# Patient Record
Sex: FEMALE | Race: WHITE | Employment: OTHER | URBAN - METROPOLITAN AREA
[De-identification: names, ages, dates, MRNs, and addresses within clinical notes are randomized per-mention and may not be internally consistent; named-entity substitution may affect disease eponyms.]

---

## 2017-06-08 ENCOUNTER — TRANSFERRED RECORDS (OUTPATIENT)
Dept: HEALTH INFORMATION MANAGEMENT | Facility: CLINIC | Age: 82
End: 2017-06-08

## 2017-11-30 ENCOUNTER — TRANSFERRED RECORDS (OUTPATIENT)
Dept: HEALTH INFORMATION MANAGEMENT | Facility: CLINIC | Age: 82
End: 2017-11-30

## 2019-10-07 ENCOUNTER — OFFICE VISIT (OUTPATIENT)
Dept: FAMILY MEDICINE | Facility: CLINIC | Age: 84
End: 2019-10-07
Payer: MEDICARE

## 2019-10-07 VITALS
BODY MASS INDEX: 27.47 KG/M2 | DIASTOLIC BLOOD PRESSURE: 76 MMHG | HEIGHT: 62 IN | SYSTOLIC BLOOD PRESSURE: 127 MMHG | WEIGHT: 149.3 LBS | TEMPERATURE: 98.6 F | RESPIRATION RATE: 16 BRPM | OXYGEN SATURATION: 97 % | HEART RATE: 76 BPM

## 2019-10-07 DIAGNOSIS — Z85.038 HISTORY OF COLON CANCER: ICD-10-CM

## 2019-10-07 DIAGNOSIS — E03.9 HYPOTHYROIDISM, UNSPECIFIED TYPE: Primary | ICD-10-CM

## 2019-10-07 DIAGNOSIS — Z85.3 HX: BREAST CANCER: ICD-10-CM

## 2019-10-07 PROCEDURE — 99203 OFFICE O/P NEW LOW 30 MIN: CPT | Performed by: PHYSICIAN ASSISTANT

## 2019-10-07 PROCEDURE — 84439 ASSAY OF FREE THYROXINE: CPT | Performed by: PHYSICIAN ASSISTANT

## 2019-10-07 PROCEDURE — 84443 ASSAY THYROID STIM HORMONE: CPT | Performed by: PHYSICIAN ASSISTANT

## 2019-10-07 PROCEDURE — 36415 COLL VENOUS BLD VENIPUNCTURE: CPT | Performed by: PHYSICIAN ASSISTANT

## 2019-10-07 ASSESSMENT — MIFFLIN-ST. JEOR: SCORE: 1090.47

## 2019-10-07 NOTE — PROGRESS NOTES
"Subjective     Rosalba Logan is a 84 year old female who presents to clinic today for the following health issues:    HPI   Chief Complaint   Patient presents with     Establish Care     Establish Care; would also like to get Thyroid checked     Patient here today to Establish Care and to have her Thyroid checked-    She did live here 15+ years ago, but did move to [place] to help with daughter.  They have moved back recently.    Since we have seen her last, she did have breast cancer and did have b/l mastectomy.  She also had colon cancer over 20 years ago.  No issues since.  She was due to have recheck colonoscopy last year, is unsure if she wants to continue screening at this time.    She has been on synthroid for the last 3 months, and was supposed to recheck soon, but due to moving, has not.  Does feel that her dose may be a bit off.  Last TSH in June was 0.07 and free T4 was 1.13.  She is currently on 50 mcg synthroid.        BP Readings from Last 3 Encounters:   10/07/19 127/76    Wt Readings from Last 3 Encounters:   10/07/19 67.7 kg (149 lb 4.8 oz)                      Reviewed and updated as needed this visit by Provider         Review of Systems   ROS COMP: Constitutional, HEENT, cardiovascular, pulmonary, gi and gu systems are negative, except as otherwise noted.      Objective    /76   Pulse 76   Temp 98.6  F (37  C) (Oral)   Resp 16   Ht 1.575 m (5' 2\")   Wt 67.7 kg (149 lb 4.8 oz)   SpO2 97%   BMI 27.31 kg/m    Body mass index is 27.31 kg/m .  Physical Exam   GENERAL: alert and no distress  EYES: Eyes grossly normal to inspection  RESP: lungs clear to auscultation - no rales, rhonchi or wheezes  CV: regular rate and rhythm, normal S1 S2, no S3 or S4, no murmur, click or rub, no peripheral edema and peripheral pulses strong  PSYCH: mentation appears normal, affect normal/bright    Diagnostic Test Results:  Labs reviewed in Epic        Assessment & Plan     1. Hypothyroidism, unspecified " "type  Due for recheck.  Will call in refill or adjust med based on results.  - TSH  - T4, free    2. History of colon cancer  Over 20 years ago, no issues since.  Unsure if she wishes to continue colon screening.  She is going to continue to think about    3. HX: breast cancer  S/p b/l mastectomy.    She is not interested in any vaccines at this point.    BMI:   Estimated body mass index is 27.31 kg/m  as calculated from the following:    Height as of this encounter: 1.575 m (5' 2\").    Weight as of this encounter: 67.7 kg (149 lb 4.8 oz).               Return in about 6 months (around 4/7/2020).    Antony Flowers PA-C  [facility]      "

## 2019-10-07 NOTE — NURSING NOTE
"Chief Complaint   Patient presents with     Establish Care     Establish Care; would also like to get Thyroid checked     /76   Pulse 76   Resp 16   Ht 1.575 m (5' 2\")   Wt 67.7 kg (149 lb 4.8 oz)   SpO2 97%   BMI 27.31 kg/m   Estimated body mass index is 27.31 kg/m  as calculated from the following:    Height as of this encounter: 1.575 m (5' 2\").    Weight as of this encounter: 67.7 kg (149 lb 4.8 oz).  Medication Reconciliation: complete        Health Maintenance Due Pending Provider Review:  NONE    n/a    Alba Austin MA  [facility]  [phone]  "

## 2019-10-08 ENCOUNTER — TELEPHONE (OUTPATIENT)
Dept: FAMILY MEDICINE | Facility: CLINIC | Age: 84
End: 2019-10-08

## 2019-10-08 PROBLEM — C50.919 BREAST CANCER (H): Status: ACTIVE | Noted: 2019-10-08

## 2019-10-08 LAB
T4 FREE SERPL-MCNC: 0.96 NG/DL (ref 0.76–1.46)
TSH SERPL DL<=0.005 MIU/L-ACNC: 10.85 MU/L (ref 0.4–4)

## 2019-10-08 RX ORDER — LEVOTHYROXINE SODIUM 75 UG/1
75 TABLET ORAL DAILY
Qty: 90 TABLET | Refills: 1 | Status: SHIPPED | OUTPATIENT
Start: 2019-10-08 | End: 2020-05-20

## 2019-10-08 RX ORDER — LEVOTHYROXINE SODIUM 50 UG/1
50 TABLET ORAL DAILY
COMMUNITY
Start: 2019-10-08 | End: 2019-10-08

## 2019-10-08 NOTE — TELEPHONE ENCOUNTER
Notes recorded by Antony Flowers PA-C on 10/8/2019 at 5:04 PM CDT    Please call with results.    Her tsh level is elevated, which means we need to increase her synthroid dose.  She was symptomatic, so this was expected.  I see she is on 50 mcg, so will call in 75 mcg.  Will recheck this in 3-6 months    Miguel Angel Flowers PA-C

## 2019-10-08 NOTE — RESULT ENCOUNTER NOTE
Please call with results.    Her tsh level is elevated, which means we need to increase her synthroid dose.  She was symptomatic, so this was expected.  I see she is on 50 mcg, so will call in 75 mcg.  Will recheck this in 3-6 months    Miguel Angel Flowers PA-C

## 2019-10-31 ENCOUNTER — HEALTH MAINTENANCE LETTER (OUTPATIENT)
Age: 84
End: 2019-10-31

## 2019-11-04 ENCOUNTER — ANCILLARY PROCEDURE (OUTPATIENT)
Dept: GENERAL RADIOLOGY | Facility: CLINIC | Age: 84
End: 2019-11-04
Attending: FAMILY MEDICINE
Payer: MEDICARE

## 2019-11-04 ENCOUNTER — OFFICE VISIT (OUTPATIENT)
Dept: FAMILY MEDICINE | Facility: CLINIC | Age: 84
End: 2019-11-04
Payer: MEDICARE

## 2019-11-04 VITALS
HEIGHT: 62 IN | HEART RATE: 74 BPM | TEMPERATURE: 97.4 F | RESPIRATION RATE: 14 BRPM | SYSTOLIC BLOOD PRESSURE: 121 MMHG | BODY MASS INDEX: 27.57 KG/M2 | OXYGEN SATURATION: 100 % | WEIGHT: 149.8 LBS | DIASTOLIC BLOOD PRESSURE: 73 MMHG

## 2019-11-04 DIAGNOSIS — L85.3 DRY SKIN: ICD-10-CM

## 2019-11-04 DIAGNOSIS — S62.316A CLOSED DISPLACED FRACTURE OF BASE OF FIFTH METACARPAL BONE OF RIGHT HAND, INITIAL ENCOUNTER: Primary | ICD-10-CM

## 2019-11-04 PROCEDURE — 99214 OFFICE O/P EST MOD 30 MIN: CPT | Performed by: FAMILY MEDICINE

## 2019-11-04 PROCEDURE — 73130 X-RAY EXAM OF HAND: CPT | Mod: RT

## 2019-11-04 ASSESSMENT — PAIN SCALES - GENERAL: PAINLEVEL: MODERATE PAIN (4)

## 2019-11-04 ASSESSMENT — MIFFLIN-ST. JEOR: SCORE: 1092.74

## 2019-11-04 NOTE — PROGRESS NOTES
Subjective     Rosalba Logan is a 84 year old female who presents to clinic today for the following health issues:    HPI     Patient in for right hand pain, Fell about 2 weeks ago and landed on her hand somehow, Also would like for Dr Mccarty to look at a rash on her back, states that its been there for a long time     PROBLEMS TO ADD ON...    BP Readings from Last 3 Encounters:   11/04/19 121/73   10/07/19 127/76    Wt Readings from Last 3 Encounters:   11/04/19 67.9 kg (149 lb 12.8 oz)   10/07/19 67.7 kg (149 lb 4.8 oz)                    PROBLEMS TO ADD ON...  Reviewed and updated as needed this visit by Provider         Review of Systems   ROS COMP: Constitutional, HEENT, cardiovascular, pulmonary, GI, , musculoskeletal, neuro, skin, endocrine and psych systems are negative, except as otherwise noted.      Objective    There were no vitals taken for this visit.  There is no height or weight on file to calculate BMI.  Physical Exam   GENERAL: healthy, alert and no distress  NECK: no adenopathy, no asymmetry, masses, or scars and thyroid normal to palpation  RESP: lungs clear to auscultation - no rales, rhonchi or wheezes  CV: regular rate and rhythm, normal S1 S2, no S3 or S4, no murmur, click or rub, no peripheral edema and peripheral pulses strong  ABDOMEN: soft, nontender, no hepatosplenomegaly, no masses and bowel sounds normal  MS: no gross musculoskeletal defects noted, no edema    Diagnostic Test Results:  Labs reviewed in Epic        Assessment & Plan     1. Closed displaced fracture of base of fifth metacarpal bone of right hand, initial encounter    - XR Hand Right G/E 3 Views, does show minimally displaced 5th MCP fracture.  Does need short cast- see orthopedic .  - ORTHOPEDICS ADULT REFERRAL    2. Dry skin  Avoid hot water showers  Use moisturizer  within  mins of shower  Follow up as needed         BMI:   Estimated body mass index is 27.4 kg/m  as calculated from the following:    Height as  "of this encounter: 1.575 m (5' 2\").    Weight as of this encounter: 67.9 kg (149 lb 12.8 oz).       }    Return in about 4 months (around 3/4/2020) for concerns,unresolved.    Geetha Mccarty MD  [facility]        "

## 2019-11-04 NOTE — RESULT ENCOUNTER NOTE
Dear Rosalba    The radiologist confirmed the fracture-that we discussed in the clinic today.  I do recommend to proceed with orthopedic consultation- & the conceirge service should be calling you back in 1-2 days.    Please keep us posted with questions or concerns .      Best Regards,    Geetha Mccarty MD  [facility]  [phone]

## 2019-11-04 NOTE — PATIENT INSTRUCTIONS
1. Hand injury, right, initial encounter  - XR Hand Right G/E 3 Views  - fracture- see orthopedic    2. Dry skin  Over the counter Eucerin  Avoid hot water shower

## 2019-11-07 ENCOUNTER — TELEPHONE (OUTPATIENT)
Dept: FAMILY MEDICINE | Facility: CLINIC | Age: 84
End: 2019-11-07

## 2019-11-07 NOTE — TELEPHONE ENCOUNTER
Reason for Call:  Other call back    Detailed comments: Patient is calling because she got referred to  with sport medicine and patient thought she was going to be referred to a Ortho surgeon. She is calling to fady who she sould need to see first.     Phone Number Patient can be reached at: Home number on file [phone] (home)    Best Time: anytime     Can we leave a detailed message on this number? YES    Call taken on 11/7/2019 at 10:20 AM by Ольга Young

## 2019-11-08 NOTE — TELEPHONE ENCOUNTER
Spoke with patient today   Patient was a little confused on who or where she needed to go for  Her fracture  Patient stats she has a up coming appointment with  for a consult  Advised patient to keep appointment and from there  will either refer her to having surgery or  His recommendation.  Is this what you wanted patient to do.

## 2019-11-11 ENCOUNTER — OFFICE VISIT (OUTPATIENT)
Dept: ORTHOPEDICS | Facility: CLINIC | Age: 84
End: 2019-11-11
Payer: MEDICARE

## 2019-11-11 VITALS
HEIGHT: 62 IN | SYSTOLIC BLOOD PRESSURE: 135 MMHG | BODY MASS INDEX: 27.42 KG/M2 | DIASTOLIC BLOOD PRESSURE: 72 MMHG | WEIGHT: 149 LBS

## 2019-11-11 DIAGNOSIS — S62.356A CLOSED NONDISPLACED FRACTURE OF SHAFT OF FIFTH METACARPAL BONE OF RIGHT HAND, INITIAL ENCOUNTER: Primary | ICD-10-CM

## 2019-11-11 PROCEDURE — 99203 OFFICE O/P NEW LOW 30 MIN: CPT | Performed by: FAMILY MEDICINE

## 2019-11-11 ASSESSMENT — MIFFLIN-ST. JEOR: SCORE: 1089.11

## 2019-11-11 NOTE — LETTER
11/11/2019         RE: Rosalba Logan  [address] [id number]        Dear Colleague,    Thank you for referring your patient, Rosalba Logan, to the  SPORTS MEDICINE. Please see a copy of my visit note below.    [facility]   Clinic Visit s Nov 11, 2019    PCP: No Ref-Primary, Physician      Rosalba is a 84 year old female who is seen in consultation at the request of Dr. Mccarty for   Chief Complaint   Patient presents with     Right Hand - Pain       Injury: Patient describes injury as FOOSH.      Right hand dominant    Location of Pain: right hand ulnar, nonradiating   Duration of Pain: acute, 1 month(s),   Rating of Pain at worst: 8/10  Rating of Pain Currently: 2/10  Pain is better with: activity avoidance   Pain is worse with: ADLs:  all activities, gripping and lifting  Treatment so far consists of: activity avoidance, rest and advil  Associated symptoms: swelling Mild  Recent imaging completed: X-rays completed 11/4/19.  Prior History of related problems: none    Social History: retired     Past Medical History:   Diagnosis Date     Breast cancer (H) 10/8/2019     Colon cancer (H) 10/8/2019       Patient Active Problem List    Diagnosis Date Noted     Closed displaced fracture of base of fifth metacarpal bone of right hand, initial encounter 11/04/2019     Priority: Medium     Dry skin 11/04/2019     Priority: Medium     Hypothyroidism, unspecified type 10/08/2019     Priority: Medium     Colon cancer (H) 10/08/2019     Priority: Medium     Breast cancer (H) 10/08/2019     Priority: Medium     Family medical history reviewed, noncontributory towards today's issue.    Social History     Socioeconomic History     Marital status: Single     Spouse name: Not on file     Number of children: Not on file     Years of education: Not on file     Highest education level: Not on file   Occupational History     Not on file   Social Needs     Financial  "resource strain: Not on file     Food insecurity:     Worry: Not on file     Inability: Not on file     Transportation needs:     Medical: Not on file     Non-medical: Not on file   Tobacco Use     Smoking status: Former Smoker     Packs/day: 0.00     Smokeless tobacco: Never Used       Past Surgical History:   Procedure Laterality Date     COLON SURGERY       MASTECTOMY, BILATERAL             Review of Systems   Musculoskeletal: Positive for joint pain.   All other systems reviewed and are negative.        Physical Exam  /72   Ht 1.575 m (5' 2\")   Wt 67.6 kg (149 lb)   BMI 27.25 kg/m     Constitutional:well-developed, well-nourished, and in no distress.   Cardiovascular: Intact distal pulses.    Neurological: alert. Gait Normal:   Gait, station, stance, and balance appear normal for age  Skin: Skin is warm and dry.   Psychiatric: Mood and affect normal.   Respiratory: unlabored, speaks in full sentences  Lymph: no LAD, no lymphangitis            Right Hand Exam     Tenderness   The patient is experiencing tenderness in the ulnar area.    Range of Motion   Wrist   Extension: 45   Flexion: normal   Pronation: normal   Supination: normal     Muscle Strength   Wrist extension: 5/5   Wrist flexion: 5/5   : 4/5     Other   Erythema: absent  Scars: absent  Sensation: normal  Pulse: present    Comments:  Mild tenderness over the midshaft of the fifth metacarpal region, which has a well-rounded bony prominence along the dorsal aspect of the fifth metacarpal          X-ray images previously ordered and independently reviewed by me in the office today with the patient. X-ray shows:     Recent Results (from the past 744 hour(s))   XR Hand Right G/E 3 Views    Narrative    XR RIGHT HAND THREE OR MORE VIEWS   11/4/2019 3:37 PM     HISTORY: Closed displaced fracture of base of fifth metacarpal bone of  right hand, initial encounter.    COMPARISON: None.    FINDINGS: There is a minimally displaced transverse fracture " in the  proximal metaphysis of the fifth metacarpal. No angulation.    Mild osteoarthrosis of the STT joint. Moderate osteoarthrosis of the  first CMC joint. Mild osteoarthrosis in the MCP joints. Mild to  moderate osteoarthrosis of the interphalangeal joints of the fingers  and thumb. Otherwise negative.      Impression    IMPRESSION:  1. Minimally displaced age-indeterminate fracture of the fifth  metacarpal.  2. Osteoarthrosis.    VANESSA MAN MD     ASSESSMENT/PLAN    ICD-10-CM    1. Closed nondisplaced fracture of shaft of fifth metacarpal bone of right hand, initial encounter S62.356A order for DME     Healing minimally displaced proximal shaft fifth metacarpal fracture, subacute.  Given a wrist brace for support and comfort during active hand use, otherwise however she is healing remarkably well.  Follow-up as needed.  Encouraged use of the brace for vigorous hand activities for at least another 3 to 4 weeks.    Again, thank you for allowing me to participate in the care of your patient.        Sincerely,        Julius Somers MD

## 2019-11-11 NOTE — PROGRESS NOTES
[facility]   Clinic Visit s Nov 11, 2019    PCP: No Ref-Primary, Physician      Rosalba is a 84 year old female who is seen in consultation at the request of Dr. Mccarty for   Chief Complaint   Patient presents with     Right Hand - Pain       Injury: Patient describes injury as FOOSH.      Right hand dominant    Location of Pain: right hand ulnar, nonradiating   Duration of Pain: acute, 1 month(s),   Rating of Pain at worst: 8/10  Rating of Pain Currently: 2/10  Pain is better with: activity avoidance   Pain is worse with: ADLs:  all activities, gripping and lifting  Treatment so far consists of: activity avoidance, rest and advil  Associated symptoms: swelling Mild  Recent imaging completed: X-rays completed 11/4/19.  Prior History of related problems: none    Social History: retired     Past Medical History:   Diagnosis Date     Breast cancer (H) 10/8/2019     Colon cancer (H) 10/8/2019       Patient Active Problem List    Diagnosis Date Noted     Closed displaced fracture of base of fifth metacarpal bone of right hand, initial encounter 11/04/2019     Priority: Medium     Dry skin 11/04/2019     Priority: Medium     Hypothyroidism, unspecified type 10/08/2019     Priority: Medium     Colon cancer (H) 10/08/2019     Priority: Medium     Breast cancer (H) 10/08/2019     Priority: Medium     Family medical history reviewed, noncontributory towards today's issue.    Social History     Socioeconomic History     Marital status: Single     Spouse name: Not on file     Number of children: Not on file     Years of education: Not on file     Highest education level: Not on file   Occupational History     Not on file   Social Needs     Financial resource strain: Not on file     Food insecurity:     Worry: Not on file     Inability: Not on file     Transportation needs:     Medical: Not on file     Non-medical: Not on file   Tobacco Use     Smoking status: Former Smoker     Packs/day: 0.00      "Smokeless tobacco: Never Used       Past Surgical History:   Procedure Laterality Date     COLON SURGERY       MASTECTOMY, BILATERAL             Review of Systems   Musculoskeletal: Positive for joint pain.   All other systems reviewed and are negative.        Physical Exam  /72   Ht 1.575 m (5' 2\")   Wt 67.6 kg (149 lb)   BMI 27.25 kg/m     Constitutional:well-developed, well-nourished, and in no distress.   Cardiovascular: Intact distal pulses.    Neurological: alert. Gait Normal:   Gait, station, stance, and balance appear normal for age  Skin: Skin is warm and dry.   Psychiatric: Mood and affect normal.   Respiratory: unlabored, speaks in full sentences  Lymph: no LAD, no lymphangitis            Right Hand Exam     Tenderness   The patient is experiencing tenderness in the ulnar area.    Range of Motion   Wrist   Extension: 45   Flexion: normal   Pronation: normal   Supination: normal     Muscle Strength   Wrist extension: 5/5   Wrist flexion: 5/5   : 4/5     Other   Erythema: absent  Scars: absent  Sensation: normal  Pulse: present    Comments:  Mild tenderness over the midshaft of the fifth metacarpal region, which has a well-rounded bony prominence along the dorsal aspect of the fifth metacarpal          X-ray images previously ordered and independently reviewed by me in the office today with the patient. X-ray shows:     Recent Results (from the past 744 hour(s))   XR Hand Right G/E 3 Views    Narrative    XR RIGHT HAND THREE OR MORE VIEWS   11/4/2019 3:37 PM     HISTORY: Closed displaced fracture of base of fifth metacarpal bone of  right hand, initial encounter.    COMPARISON: None.    FINDINGS: There is a minimally displaced transverse fracture in the  proximal metaphysis of the fifth metacarpal. No angulation.    Mild osteoarthrosis of the STT joint. Moderate osteoarthrosis of the  first CMC joint. Mild osteoarthrosis in the MCP joints. Mild to  moderate osteoarthrosis of the interphalangeal " joints of the fingers  and thumb. Otherwise negative.      Impression    IMPRESSION:  1. Minimally displaced age-indeterminate fracture of the fifth  metacarpal.  2. Osteoarthrosis.    VANESSA MAN MD     ASSESSMENT/PLAN    ICD-10-CM    1. Closed nondisplaced fracture of shaft of fifth metacarpal bone of right hand, initial encounter S62.356A order for DME     Healing minimally displaced proximal shaft fifth metacarpal fracture, subacute.  Given a wrist brace for support and comfort during active hand use, otherwise however she is healing remarkably well.  Follow-up as needed.  Encouraged use of the brace for vigorous hand activities for at least another 3 to 4 weeks.

## 2020-01-29 ENCOUNTER — OFFICE VISIT (OUTPATIENT)
Dept: OPHTHALMOLOGY | Facility: CLINIC | Age: 85
End: 2020-01-29
Attending: OPTOMETRIST
Payer: MEDICARE

## 2020-01-29 ENCOUNTER — OFFICE VISIT (OUTPATIENT)
Dept: OPTOMETRY | Facility: CLINIC | Age: 85
End: 2020-01-29
Payer: COMMERCIAL

## 2020-01-29 DIAGNOSIS — H35.373 EPIRETINAL MEMBRANE (ERM) OF BOTH EYES: ICD-10-CM

## 2020-01-29 DIAGNOSIS — H26.491 PCO (POSTERIOR CAPSULAR OPACIFICATION), RIGHT: ICD-10-CM

## 2020-01-29 DIAGNOSIS — H52.31 ANISOMETROPIA AND ANISEIKONIA: Primary | ICD-10-CM

## 2020-01-29 DIAGNOSIS — H52.31 ANISOMETROPIA: ICD-10-CM

## 2020-01-29 DIAGNOSIS — D18.01 HEMANGIOMA OF EYELID: Primary | ICD-10-CM

## 2020-01-29 DIAGNOSIS — H35.341 LAMELLAR MACULAR HOLE OF RIGHT EYE: ICD-10-CM

## 2020-01-29 DIAGNOSIS — H52.32 ANISOMETROPIA AND ANISEIKONIA: Primary | ICD-10-CM

## 2020-01-29 DIAGNOSIS — H53.143 VISUAL DISCOMFORT OF BOTH EYES: ICD-10-CM

## 2020-01-29 PROBLEM — H02.402: Status: ACTIVE | Noted: 2018-08-28

## 2020-01-29 PROBLEM — G47.33 OBSTRUCTIVE SLEEP APNEA: Status: ACTIVE | Noted: 2018-09-13

## 2020-01-29 PROBLEM — E03.9 HYPOTHYROID: Status: ACTIVE | Noted: 2019-10-08

## 2020-01-29 PROBLEM — H02.112 CICATRICIAL ECTROPION OF RIGHT LOWER EYELID: Status: ACTIVE | Noted: 2018-08-28

## 2020-01-29 PROBLEM — C18.9 MALIGNANT TUMOR OF COLON (H): Status: ACTIVE | Noted: 2019-10-08

## 2020-01-29 PROCEDURE — G0463 HOSPITAL OUTPT CLINIC VISIT: HCPCS | Mod: ZF

## 2020-01-29 PROCEDURE — 92015 DETERMINE REFRACTIVE STATE: CPT | Mod: GY,ZF

## 2020-01-29 PROCEDURE — 92285 EXTERNAL OCULAR PHOTOGRAPHY: CPT | Mod: ZF | Performed by: OPTOMETRIST

## 2020-01-29 PROCEDURE — 92134 CPTRZ OPH DX IMG PST SGM RTA: CPT | Mod: ZF | Performed by: OPTOMETRIST

## 2020-01-29 RX ORDER — MULTIVIT-MIN/IRON/FOLIC/HRB186 3.3 MG-25
1 TABLET ORAL
COMMUNITY
Start: 2008-07-03

## 2020-01-29 RX ORDER — AA/PROT/LYSINE/METHIO/VIT C/B6 50-12.5 MG
1 TABLET ORAL
COMMUNITY
Start: 2008-07-03

## 2020-01-29 ASSESSMENT — EXTERNAL EXAM - LEFT EYE
OS_EXAM: NORMAL
OS_EXAM: NORMAL

## 2020-01-29 ASSESSMENT — REFRACTION_WEARINGRX
OD_ADD: +2.75
OS_CYLINDER: +2.25
OS_HBASE: OUT
OD_CYLINDER: +0.75
OD_HPRISM: 2.0
OD_SPHERE: -0.25
OS_HPRISM: 2.0
OS_SPHERE: -1.50
OD_HBASE: IN
OS_AXIS: 170
OD_AXIS: 025

## 2020-01-29 ASSESSMENT — REFRACTION_CURRENTRX
OS_BRAND: AIR OPTIX ASTIG
OS_SPHERE: +1.00
OS_CYLINDER: -2.25
OD_BRAND: BF TORIC
OD_AXIS: 110
OD_CYLINDER: -0.75
OS_AXIS: 080
OD_SPHERE: +0.50

## 2020-01-29 ASSESSMENT — REFRACTION_MANIFEST
OS_ADD: +2.75
OS_CYLINDER: +2.75
OD_SPHERE: -0.25
OS_CYLINDER: +2.75
OD_AXIS: 033
OS_SPHERE: -1.25
OS_AXIS: 176
OD_CYLINDER: +0.75
OD_CYLINDER: +1.00
OD_ADD: +2.75
OD_SPHERE: PLANO
METHOD_AUTOREFRACTION: 1
OS_SPHERE: -2.00
OD_AXIS: 040
OS_AXIS: 175

## 2020-01-29 ASSESSMENT — SLIT LAMP EXAM - LIDS: COMMENTS: NORMAL

## 2020-01-29 ASSESSMENT — EXTERNAL EXAM - RIGHT EYE
OD_EXAM: NORMAL
OD_EXAM: NORMAL

## 2020-01-29 ASSESSMENT — KERATOMETRY
OD_AXISANGLE_DEGREES: 040
OD_K1POWER_DIOPTERS: 42.87
OS_K1POWER_DIOPTERS: 42.87
OS_K2POWER_DIOPTERS: 44.12
OS_AXISANGLE_DEGREES: 165
OD_AXISANGLE2_DEGREES: 130
OS_AXISANGLE2_DEGREES: 075
METHOD_AUTO_MANUAL: AUTO-K
OD_K2POWER_DIOPTERS: 43.50

## 2020-01-29 ASSESSMENT — TONOMETRY
OS_IOP_MMHG: 19
OD_IOP_MMHG: 14
IOP_METHOD: TONOPEN

## 2020-01-29 ASSESSMENT — CONF VISUAL FIELD
OD_NORMAL: 1
OS_NORMAL: 1

## 2020-01-29 ASSESSMENT — VISUAL ACUITY
CORRECTION_TYPE: GLASSES
OD_CC: J2
OS_CC: J2
OD_CC: 20/40
METHOD: SNELLEN - LINEAR
OS_CC: 20/25

## 2020-01-29 NOTE — PROGRESS NOTES
"HPI:  Patient has had issues with the eyes \"not working together\" retinal surgery in the left eye 2011. She denies diplopia. The eyes feels stressed. She likes closing the right eye which improves the vision.       Pertinent Medical History:    Colon cancer 10/2019    Hypothyroid    Breast cancer 10/2019    Obstructive sleep apnea    Ocular History:    Blepharitis both eyes    Cicatrical ectropion RLL    Macular degeneration both eyes    Cataract right eye     Pseudophakia left eye 01/2011    Bilateral levator resection 09/16/2011    Retinal surgery left eye 2011    Aunt was blind - not sure why.     Eye Medications:    Allergic to polytrim    Allergic to refresh    Assessment and Plan:  1.   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     Patient has been unhappy with her vision since cataract surgery in 2011. She feels her eyes are \"not working\" together.     Contact lens trial with Dr. Bustillos today. If she is happy, consider contact lenses, LASIK, or IOL exchange.     2.   S/P bilateral levator resection 09/16/2011    Doing well. Monitor.     3.   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     right eye: inferior metamorphopsia    left eye: superior metamorphopsia    Vision is 20/25 in the right eye. Lamellar hole is likely not surgical. Follow up with retina in 4-6 months to establish care. Return immediately if there are changes to amsler grid.     4.   Hemangioma, RLL. Longstanding.     Daughter has hemangioma in the sinus.     Patient mentions she has these red spots everywhere and stable.     The lower lid lesion has been longstanding and unchanged as well.     Slit lamp photos today 01/29/2020. Reviewed photo with Dr. Kumar - agrees hemangioma    5.   PCO, right eye.     Okay to monitor for now.         Medical History:  Past Medical History:   Diagnosis Date     Breast cancer (H) 10/8/2019     Colon cancer (H) 10/8/2019       Medications:  Current Outpatient Medications   Medication Sig Dispense " Refill     levothyroxine (SYNTHROID/LEVOTHROID) 75 MCG tablet Take 1 tablet (75 mcg) by mouth daily 90 tablet 1     order for DME Equipment being ordered: wrist, quickfit, right 1 Device 0   Complete documentation of historical and exam elements from today's encounter can be found in the full encounter summary report (not reduplicated in this progress note). I personally obtained the chief complaint(s) and history of present illness.  I confirmed and edited as necessary the review of systems, past medical/surgical history, family history, social history, and examination findings as documented by others; and I examined the patient myself. I personally reviewed the relevant tests, images, and reports as documented above. I formulated and edited as necessary the assessment and plan and discussed the findings and management plan with the patient and family. - Rhett Phillips OD

## 2020-01-30 ASSESSMENT — VISUAL ACUITY
OS_CC: 20/25
CORRECTION_TYPE: GLASSES
OD_CC: 20/40
METHOD: SNELLEN - LINEAR

## 2020-01-30 ASSESSMENT — SLIT LAMP EXAM - LIDS: COMMENTS: NORMAL

## 2020-01-30 NOTE — PROGRESS NOTES
A/P  1.) Anisometropia s/p CE/IOL  -Did not adapt well to monovision, moderate cyl left eye  -C/o closing one eye while reading (typically right eye)  -Good response to CL trial today. She does not need to close either eye with OTC readers over contact lenses  -Reviewed options with pt and daughter. I would not recommend IOL exchange given retinal issues. They can consider LASIK left eye. She is interested in trying to see if she can manage contact lenses    I will order trials in exact Rx and have her return for I&R to see if soft contacts are a viable option for her

## 2020-02-08 ENCOUNTER — HEALTH MAINTENANCE LETTER (OUTPATIENT)
Age: 85
End: 2020-02-08

## 2020-02-13 ENCOUNTER — OFFICE VISIT (OUTPATIENT)
Dept: OPHTHALMOLOGY | Facility: CLINIC | Age: 85
End: 2020-02-13
Payer: COMMERCIAL

## 2020-02-13 DIAGNOSIS — H52.31 ANISOMETROPIA: Primary | ICD-10-CM

## 2020-02-13 ASSESSMENT — REFRACTION_CURRENTRX
OD_BRAND: BF TORIC TRIAL
OD_CYLINDER: -0.75
OD_SPHERE: +0.50
OS_SPHERE: +1.50
OD_AXIS: 120
OS_AXIS: 080
OS_CYLINDER: -2.25
OS_BRAND: BF TORIC TRIAL

## 2020-02-13 ASSESSMENT — VISUAL ACUITY
OD_CC: 20/40
OS_CC: 20/25
CORRECTION_TYPE: GLASSES
METHOD: SNELLEN - LINEAR

## 2020-02-13 NOTE — PROGRESS NOTES
A/P  1.) Anisometropia s/p CE/IOL  -Did not adapt well to monovision, moderate cyl left eye  -C/o closing one eye while reading (typically right eye)  -Good response to CL trial today. She does not need to close either eye with OTC readers over contact lenses  -Successful soft CL I&R today, reviewed CL care and hygiene  -DVO and OTC readers over    Will order lenses will small adjustments, mail to pt. Okay to order if working well. If asthenopia issues persist she can consider LASIK or other options we discussed before.    I have confirmed where necessary the patient's CC, HPI and reviewed Past Medical History, Past Surgical History, Social History, Family History, Problem List, Medication List and agree with Tech note.     Charisse Bustillos, AMARILYS FAAO [facility]

## 2020-02-13 NOTE — NURSING NOTE
Chief Complaints and History of Present Illnesses   Patient presents with     Contact Lens Follow Up     Chief Complaint(s) and History of Present Illness(es)     Contact Lens Follow Up     Laterality: both eyes              Comments     Patient here for contact lens insertion and removal.    CLEMENT Frazier COT 9:09 AM February 13, 2020

## 2020-06-10 DIAGNOSIS — H35.373 EPIRETINAL MEMBRANE (ERM) OF BOTH EYES: Primary | ICD-10-CM

## 2020-06-18 ENCOUNTER — OFFICE VISIT (OUTPATIENT)
Dept: OPHTHALMOLOGY | Facility: CLINIC | Age: 85
End: 2020-06-18
Attending: OPHTHALMOLOGY
Payer: MEDICARE

## 2020-06-18 DIAGNOSIS — H35.373 EPIRETINAL MEMBRANE (ERM) OF BOTH EYES: ICD-10-CM

## 2020-06-18 DIAGNOSIS — H35.373 EPIRETINAL MEMBRANE (ERM) OF BOTH EYES: Primary | ICD-10-CM

## 2020-06-18 PROCEDURE — G0463 HOSPITAL OUTPT CLINIC VISIT: HCPCS | Mod: ZF

## 2020-06-18 PROCEDURE — 92250 FUNDUS PHOTOGRAPHY W/I&R: CPT | Mod: ZF | Performed by: OPHTHALMOLOGY

## 2020-06-18 PROCEDURE — 92134 CPTRZ OPH DX IMG PST SGM RTA: CPT | Mod: ZF | Performed by: OPHTHALMOLOGY

## 2020-06-18 ASSESSMENT — TONOMETRY
OS_IOP_MMHG: 12
OD_IOP_MMHG: 11
IOP_METHOD: ICARE

## 2020-06-18 ASSESSMENT — REFRACTION_WEARINGRX
SPECS_TYPE: PAL
OS_ADD: +2.75
OS_CYLINDER: +2.75
OD_AXIS: 033
OD_ADD: +2.75
OS_AXIS: 171
OS_SPHERE: -2.00
OD_SPHERE: -0.25
OD_CYLINDER: +0.75

## 2020-06-18 ASSESSMENT — CONF VISUAL FIELD
METHOD: COUNTING FINGERS
OD_NORMAL: 1
OS_NORMAL: 1

## 2020-06-18 ASSESSMENT — VISUAL ACUITY
OS_CC: 20/25
OD_CC: 20/25
CORRECTION_TYPE: GLASSES
OD_CC+: -1
OS_CC+: -2
METHOD: SNELLEN - LINEAR

## 2020-06-18 ASSESSMENT — SLIT LAMP EXAM - LIDS: COMMENTS: NORMAL

## 2020-06-18 ASSESSMENT — EXTERNAL EXAM - LEFT EYE: OS_EXAM: NORMAL

## 2020-06-18 ASSESSMENT — EXTERNAL EXAM - RIGHT EYE: OD_EXAM: NORMAL

## 2020-06-18 NOTE — NURSING NOTE
Chief Complaints and History of Present Illnesses   Patient presents with     Consult For     Chief Complaint(s) and History of Present Illness(es)     Consult For     Laterality: both eyes    Onset: gradual    Quality: blurred vision and distorted vision    Severity: moderate    Frequency: constantly    Course: gradually worsening    Associated symptoms: floaters.  Negative for eye pain and flashes    Treatments tried: no treatments and glasses              Comments     Referred by Dr Phillips for bilateral ERM + Lamellar Hole OD evaluation    Vision since LV has gotten much blurrier/wavier since LV. Waviness is only in the OD when OS is closed while reading.   Floaters OU, longstanding and unchanging.     Liss Dobson COT 10:21 AM June 18, 2020

## 2020-06-18 NOTE — PROGRESS NOTES
I have confirmed the patient's and reviewed Past Medical History, Past Surgical History, Social History, Family History, Problem List, Medication List and agree with Tech note.    CC: subjective visual disturbance OD > OS    HPI: patient sees wavy lines in OD for 5 days, no pain or vision loss noted     Assessment/plan:   1.  Epiretinal membrane both eyes    - lamellar hole OD   - monitor by OCT    2.  RPE changes both eyes on FAF    - pattern dystrophy or old  OU   - if and monitor       RTC 4 months Exam/OCT     Josiane Yusuf MD PhD.  Professor & Chair

## 2020-08-20 ENCOUNTER — MYC MEDICAL ADVICE (OUTPATIENT)
Dept: FAMILY MEDICINE | Facility: CLINIC | Age: 85
End: 2020-08-20

## 2020-08-20 DIAGNOSIS — E03.9 HYPOTHYROIDISM, UNSPECIFIED TYPE: Primary | ICD-10-CM

## 2020-08-21 DIAGNOSIS — E03.9 HYPOTHYROIDISM, UNSPECIFIED TYPE: ICD-10-CM

## 2020-08-21 PROCEDURE — 36415 COLL VENOUS BLD VENIPUNCTURE: CPT | Performed by: PHYSICIAN ASSISTANT

## 2020-08-21 PROCEDURE — 84443 ASSAY THYROID STIM HORMONE: CPT | Performed by: PHYSICIAN ASSISTANT

## 2020-08-21 NOTE — TELEPHONE ENCOUNTER
JS,    Patient scheduled for lab this afternoon to recheck her TSH  Needs order signed.    Thanks,  Joan Damon RN

## 2020-08-22 LAB — TSH SERPL DL<=0.005 MIU/L-ACNC: 3.08 MU/L (ref 0.4–4)

## 2020-11-07 ENCOUNTER — HEALTH MAINTENANCE LETTER (OUTPATIENT)
Age: 85
End: 2020-11-07

## 2021-01-19 ENCOUNTER — MYC MEDICAL ADVICE (OUTPATIENT)
Dept: FAMILY MEDICINE | Facility: CLINIC | Age: 86
End: 2021-01-19

## 2021-02-01 ENCOUNTER — MYC MEDICAL ADVICE (OUTPATIENT)
Dept: FAMILY MEDICINE | Facility: CLINIC | Age: 86
End: 2021-02-01

## 2021-02-02 NOTE — TELEPHONE ENCOUNTER
A.S  Please see [facility] message  Did you want patient to schedule a virtual visit to discuss?  Or just sign order for FIT test or Cologuard?    Thank you,  Hortensia Lucio RN

## 2021-02-04 ENCOUNTER — VIRTUAL VISIT (OUTPATIENT)
Dept: FAMILY MEDICINE | Facility: CLINIC | Age: 86
End: 2021-02-04
Payer: MEDICARE

## 2021-02-04 DIAGNOSIS — Z85.038 HISTORY OF COLON CANCER: Primary | ICD-10-CM

## 2021-02-04 DIAGNOSIS — Z12.11 COLON CANCER SCREENING: ICD-10-CM

## 2021-02-04 DIAGNOSIS — Z85.3 PERSONAL HISTORY OF MALIGNANT NEOPLASM OF BREAST: ICD-10-CM

## 2021-02-04 DIAGNOSIS — Z23 NEED FOR VACCINATION: ICD-10-CM

## 2021-02-04 PROBLEM — C18.9 MALIGNANT TUMOR OF COLON (H): Status: RESOLVED | Noted: 2019-10-08 | Resolved: 2021-02-04

## 2021-02-04 PROCEDURE — 99213 OFFICE O/P EST LOW 20 MIN: CPT | Mod: 95 | Performed by: FAMILY MEDICINE

## 2021-02-04 NOTE — PROGRESS NOTES
Jimbo is a 85 year old who is being evaluated via a billable video visit.      How would you like to obtain your AVS? MyChart  If the video visit is dropped, the invitation should be resent by:   Will anyone else be joining your video visit? No      Video Start Time:2:12 pm    Assessment & Plan   84 yo female, independently living female     History of colon cancer  Diagnosed & treated in 1997- since then surveillance with colonoscopy was every 5 yr- until 2015  We discussed pros & cons of colonoscopy- and advised her to discuss with her loved ones as well- in case of positive finding- what are her wishing regarding cancer treatment.  Today she is not willing for colonoscopy and we dicussed cologaurd . She is willing to complete that.  - COLOGUARD([employer] SCI[employer]ES)  Need for vaccination  She is offered to get shingrax vaccine, Pneumonia & flu vaccine .  She has been following COVID precuations- strictly at home & will consider it once covid pandemic is better- right now not leaving home     Personal history of malignant neoplasm of breast  Bilateral mastectomy 2008          Return in about 3 months (around 5/4/2021) for concerns,unresolved.    Geetha Mccarty MD  [facility]    Subjective     Jimbo is a 85 year old who presents to clinic today for the following health issues     HPI     Moved back from  [place]- since 2019  Has questions about choices of colon cancer screening- unwilling for colonoscopy due to covid pandemic.  History of Colon cancer, treated in 1997 with surgery.She continued to have regular colonoscopy until 2015 and was advised to get it completed in 2020- which did not happen due to covid.  I do see an earlier note from  10/2019- her initial presentation at [facility] to establish care as new patient.  She reports over all usual state of good jim    She had bilateral mastectomy in 2008 due to breast cancer      Review of Systems   Constitutional, HEENT, cardiovascular, pulmonary,  GI, , musculoskeletal, neuro, skin, endocrine and psych systems are negative, except as otherwise noted.      Objective           Vitals:  No vitals were obtained today due to virtual visit.    Physical Exam   GENERAL: Healthy, alert and no distress  EYES: Eyes grossly normal to inspection.  No discharge or erythema, or obvious scleral/conjunctival abnormalities.  RESP: No audible wheeze, cough, or visible cyanosis.  No visible retractions or increased work of breathing.    SKIN: Visible skin clear. No significant rash, abnormal pigmentation or lesions.  NEURO: Cranial nerves grossly intact.  Mentation and speech appropriate for age.  PSYCH: Mentation appears normal, affect normal/bright, judgement and insight intact, normal speech and appearance well-groomed.                Video-Visit Details    Type of service:  Video Visit    Video End Time:2:32pm    Originating Location (pt. Location): Home    Distant Location (provider location):  [facility]     Platform used for Video Visit: [employer]

## 2021-02-04 NOTE — NURSING NOTE
"Chief Complaint   Patient presents with     Consult     Fit test personal history of colon cancer     initial There were no vitals taken for this visit. Estimated body mass index is 27.25 kg/m  as calculated from the following:    Height as of 11/11/19: 1.575 m (5' 2\").    Weight as of 11/11/19: 67.6 kg (149 lb).  BP completed using cuff size: .  L  R arm      Health Maintenance that is potentially due pending provider review:      Woody Young ma  "

## 2021-02-22 ENCOUNTER — IMMUNIZATION (OUTPATIENT)
Dept: NURSING | Facility: CLINIC | Age: 86
End: 2021-02-22
Payer: MEDICARE

## 2021-02-22 DIAGNOSIS — E03.9 HYPOTHYROIDISM, UNSPECIFIED TYPE: ICD-10-CM

## 2021-02-22 LAB — COLOGUARD-ABSTRACT: NEGATIVE

## 2021-02-22 PROCEDURE — 0001A PR COVID VAC PFIZER DIL RECON 30 MCG/0.3 ML IM: CPT

## 2021-02-22 PROCEDURE — 91300 PR COVID VAC PFIZER DIL RECON 30 MCG/0.3 ML IM: CPT

## 2021-02-22 RX ORDER — THYROID 60 MG/1
60 TABLET ORAL DAILY
Qty: 90 TABLET | Refills: 1 | Status: SHIPPED | OUTPATIENT
Start: 2021-02-22 | End: 2021-05-24

## 2021-03-15 ENCOUNTER — IMMUNIZATION (OUTPATIENT)
Dept: NURSING | Facility: CLINIC | Age: 86
End: 2021-03-15
Attending: NURSE PRACTITIONER
Payer: MEDICARE

## 2021-03-15 PROCEDURE — 91300 PR COVID VAC PFIZER DIL RECON 30 MCG/0.3 ML IM: CPT

## 2021-03-15 PROCEDURE — 0002A PR COVID VAC PFIZER DIL RECON 30 MCG/0.3 ML IM: CPT

## 2021-03-21 ENCOUNTER — HEALTH MAINTENANCE LETTER (OUTPATIENT)
Age: 86
End: 2021-03-21

## 2021-05-21 ENCOUNTER — TELEPHONE (OUTPATIENT)
Dept: AUDIOLOGY | Facility: CLINIC | Age: 86
End: 2021-05-21

## 2021-05-21 NOTE — PROGRESS NOTES
"SUBJECTIVE:   Rosalba Logan is a 85 year old female who presents for Preventive Visit.      Patient has been advised of split billing requirements and indicates understanding: Yes   Are you in the first 12 months of your Medicare coverage?  No    Healthy Habits:     In general, how would you rate your overall health?  Excellent    Frequency of exercise:  2-3 days/week    Duration of exercise:  15-30 minutes    Do you usually eat at least 4 servings of fruit and vegetables a day, include whole grains    & fiber and avoid regularly eating high fat or \"junk\" foods?  Yes    Taking medications regularly:  Yes    Medication side effects:  None    Ability to successfully perform activities of daily living:  No assistance needed    Home Safety:  No safety concerns identified    Hearing Impairment:  Difficulty following a conversation in a noisy restaurant or crowded room, difficulty following dialogue in the theater, difficult to understand a speaker at a public meeting or Orthodox service, need to ask people to speak up or repeat themselves, difficulty understanding soft or whispered speech and difficulty understanding speech on the telephone    In the past 6 months, have you been bothered by leaking of urine?  No    In general, how would you rate your overall mental or emotional health?  Excellent      PHQ-2 Total Score: 0    Additional concerns today:  No    Do you feel safe in your environment? Yes    Have you ever done Advance Care Planning? (For example, a Health Directive, POLST, or a discussion with a medical provider or your loved ones about your wishes): Yes, patient states has an Advance Care Planning document and will bring a copy to the clinic.       Fall risk  Fallen 2 or more times in the past year?: No  Any fall with injury in the past year?: No    Cognitive Screening   1) Repeat 3 items (Leader, Season, Table)    2) Clock draw: NORMAL  3) 3 item recall: Recalls 3 objects  Results: 3 items recalled: " COGNITIVE IMPAIRMENT LESS LIKELY    Mini-CogTM Copyright LIUDMILA Shah. Licensed by the author for use in [facility]; reprinted with permission (shannon@.[facility]). All rights reserved.      Do you have sleep apnea, excessive snoring or daytime drowsiness?: yes    Reviewed and updated as needed this visit by clinical staff  Tobacco  Allergies  Meds  Problems  Med Hx  Surg Hx  Fam Hx  Soc Hx          Reviewed and updated as needed this visit by Provider  Tobacco  Allergies  Meds  Problems  Med Hx  Surg Hx  Fam Hx         Social History     Tobacco Use     Smoking status: Former Smoker     Packs/day: 0.00     Years: 0.00     Pack years: 0.00     Smokeless tobacco: Never Used   Substance Use Topics     Alcohol use: Not Currently     If you drink alcohol do you typically have >3 drinks per day or >7 drinks per week? No    Alcohol Use 5/24/2021   Prescreen: >3 drinks/day or >7 drinks/week? No   Prescreen: >3 drinks/day or >7 drinks/week? -   No flowsheet data found.          Current providers sharing in care for this patient include:   Patient Care Team:  Geetha Mccarty MD as PCP - General (Family Medicine)  Charisse Bustillos OD as Assigned Surgical Provider  Julius Somers MD as Assigned Musculoskeletal Provider  Geetha Mccarty MD as Assigned PCP    The following health maintenance items are reviewed in Epic and correct as of today:  Health Maintenance Due   Topic Date Due     DEXA  Never done     ANNUAL REVIEW OF HM ORDERS  Never done     ZOSTER IMMUNIZATION (1 of 2) Never done     Pneumococcal Vaccine: 65+ Years (1 of 1 - PPSV23) Never done     DTAP/TDAP/TD IMMUNIZATION (2 - Td) 04/23/2019     FALL RISK ASSESSMENT  06/18/2021     BP Readings from Last 3 Encounters:   05/24/21 128/78   11/11/19 135/72   11/04/19 121/73    Wt Readings from Last 3 Encounters:   11/11/19 67.6 kg (149 lb)   11/04/19 67.9 kg (149 lb 12.8 oz)   10/07/19 67.7 kg (149 lb 4.8 oz)                 "  Pneumonia Vaccine:t reports she believes she had pneumonia vaccines and cmpleted  Mammogram Screening:  Mastectomy 2008-      Review of Systems  Constitutional, HEENT, cardiovascular, pulmonary, GI, , musculoskeletal, neuro, skin, endocrine and psych systems are negative, except as otherwise noted.    OBJECTIVE:   /78   Pulse 77   Resp 16   Ht 1.575 m (5' 2\")   SpO2 95%   BMI 27.25 kg/m   Estimated body mass index is 27.25 kg/m  as calculated from the following:    Height as of this encounter: 1.575 m (5' 2\").    Weight as of 11/11/19: 67.6 kg (149 lb).  Physical Exam  GENERAL: healthy, alert and no distress  HENT: ear canals and TM's normal, nose and mouth without ulcers or lesions  NECK: no adenopathy, no asymmetry, masses, or scars and thyroid normal to palpation  RESP: lungs clear to auscultation - no rales, rhonchi or wheezes  CV: regular rate and rhythm, normal S1 S2, no S3 or S4, no murmur, click or rub, no peripheral edema and peripheral pulses strong  ABDOMEN: soft, nontender, no hepatosplenomegaly, no masses and bowel sounds normal  MS: no gross musculoskeletal defects noted, no edema  NEURO: Normal strength and tone, mentation intact and speech normal  PSYCH: mentation appears normal, affect normal/bright    Diagnostic Test Results:  Labs reviewed in Epic  No results found for this or any previous visit (from the past 24 hour(s)).    ASSESSMENT / PLAN:   Diagnoses and all orders for this visit:    Routine general medical examination at a health care facility  Pap not indicated  Mammogram- not indicated- bilateral mastectomy  Colon cancer screening- Up to date - cologuard negative- advised repeat next yr   Shingles vaccine at pharmacy  Hypothyroidism, unspecified type  -     TSH with free T4 reflex    Obstructive sleep apnea  Comments:  unable to tolerate CPAP.  Son with sleep apnea and had surgical correction and she is interested in that   ENT referral given     S/P bilateral " "mastectomy  Comments:  2008- no concerns     History of colon cancer  Comments:  1996- colonoscopy every 3 yrs.  last cologaurd- neg 02/21/21    At risk for osteoporosis last DEXA 2009 normal  Vitamin d deficient  Will check levels as well.  She does eat balance food and takes over the counter multivitamins    Cataract and macular degeneration under care of phthalmology    Patient has been advised of split billing requirements and indicates understanding: Yes  COUNSELING:  Reviewed preventive health counseling, as reflected in patient instructions       Regular exercise       Healthy diet/nutrition       Vision screening       Hearing screening       Bladder control       Fall risk prevention       Osteoporosis prevention/bone health       Advanced Planning     Estimated body mass index is 27.25 kg/m  as calculated from the following:    Height as of this encounter: 1.575 m (5' 2\").    Weight as of 11/11/19: 67.6 kg (149 lb).        She reports that she has quit smoking. She smoked 0.00 packs per day for 0.00 years. She has never used smokeless tobacco.      Appropriate preventive services were discussed with this patient, including applicable screening as appropriate for cardiovascular disease, diabetes, osteopenia/osteoporosis, and glaucoma.  As appropriate for age/gender, discussed screening for colorectal cancer, prostate cancer, breast cancer, and cervical cancer. Checklist reviewing preventive services available has been given to the patient.    Reviewed patients plan of care and provided an AVS. The Basic Care Plan (routine screening as documented in Health Maintenance) for Rosalba meets the Care Plan requirement. This Care Plan has been established and reviewed with the Patient.    Counseling Resources:  ATP IV Guidelines  Pooled Cohorts Equation Calculator  Breast Cancer Risk Calculator  Breast Cancer: Medication to Reduce Risk  FRAX Risk Assessment  ICSI Preventive Guidelines  Dietary Guidelines for " Americans, 2010  USDA's MyPlate  ASA Prophylaxis  Lung CA Screening    Geetha Mccarty MD  [facility]    Identified Health Risks:

## 2021-05-24 ENCOUNTER — OFFICE VISIT (OUTPATIENT)
Dept: FAMILY MEDICINE | Facility: CLINIC | Age: 86
End: 2021-05-24
Payer: MEDICARE

## 2021-05-24 VITALS
SYSTOLIC BLOOD PRESSURE: 128 MMHG | HEART RATE: 77 BPM | RESPIRATION RATE: 16 BRPM | BODY MASS INDEX: 27.25 KG/M2 | OXYGEN SATURATION: 95 % | HEIGHT: 62 IN | DIASTOLIC BLOOD PRESSURE: 78 MMHG

## 2021-05-24 DIAGNOSIS — Z91.89 AT RISK FOR OSTEOPOROSIS: ICD-10-CM

## 2021-05-24 DIAGNOSIS — G47.33 OBSTRUCTIVE SLEEP APNEA: ICD-10-CM

## 2021-05-24 DIAGNOSIS — E03.9 HYPOTHYROIDISM, UNSPECIFIED TYPE: ICD-10-CM

## 2021-05-24 DIAGNOSIS — Z85.038 HISTORY OF COLON CANCER: ICD-10-CM

## 2021-05-24 DIAGNOSIS — Z78.0 ASYMPTOMATIC MENOPAUSAL STATE: ICD-10-CM

## 2021-05-24 DIAGNOSIS — Z00.00 ROUTINE GENERAL MEDICAL EXAMINATION AT A HEALTH CARE FACILITY: Primary | ICD-10-CM

## 2021-05-24 DIAGNOSIS — Z90.13 S/P BILATERAL MASTECTOMY: ICD-10-CM

## 2021-05-24 DIAGNOSIS — E55.9 VITAMIN D DEFICIENCY: ICD-10-CM

## 2021-05-24 LAB
ANION GAP SERPL CALCULATED.3IONS-SCNC: 6 MMOL/L (ref 3–14)
BUN SERPL-MCNC: 12 MG/DL (ref 7–30)
CALCIUM SERPL-MCNC: 9 MG/DL (ref 8.5–10.1)
CHLORIDE SERPL-SCNC: 109 MMOL/L (ref 94–109)
CO2 SERPL-SCNC: 24 MMOL/L (ref 20–32)
CREAT SERPL-MCNC: 1.11 MG/DL (ref 0.52–1.04)
GFR SERPL CREATININE-BSD FRML MDRD: 46 ML/MIN/{1.73_M2}
GLUCOSE SERPL-MCNC: 78 MG/DL (ref 70–99)
POTASSIUM SERPL-SCNC: 4.7 MMOL/L (ref 3.4–5.3)
SODIUM SERPL-SCNC: 139 MMOL/L (ref 133–144)
TSH SERPL DL<=0.005 MIU/L-ACNC: 3.72 MU/L (ref 0.4–4)

## 2021-05-24 PROCEDURE — 82306 VITAMIN D 25 HYDROXY: CPT | Performed by: FAMILY MEDICINE

## 2021-05-24 PROCEDURE — 36415 COLL VENOUS BLD VENIPUNCTURE: CPT | Performed by: FAMILY MEDICINE

## 2021-05-24 PROCEDURE — 84443 ASSAY THYROID STIM HORMONE: CPT | Performed by: FAMILY MEDICINE

## 2021-05-24 PROCEDURE — 99213 OFFICE O/P EST LOW 20 MIN: CPT | Mod: 25 | Performed by: FAMILY MEDICINE

## 2021-05-24 PROCEDURE — G0439 PPPS, SUBSEQ VISIT: HCPCS | Performed by: FAMILY MEDICINE

## 2021-05-24 PROCEDURE — 80048 BASIC METABOLIC PNL TOTAL CA: CPT | Performed by: FAMILY MEDICINE

## 2021-05-24 RX ORDER — THYROID 60 MG/1
60 TABLET ORAL DAILY
Qty: 90 TABLET | Refills: 1 | COMMUNITY
Start: 2021-05-24 | End: 2021-09-27

## 2021-05-24 ASSESSMENT — ACTIVITIES OF DAILY LIVING (ADL): CURRENT_FUNCTION: NO ASSISTANCE NEEDED

## 2021-05-24 NOTE — PATIENT INSTRUCTIONS
Call to schedule your imaging: for dexa scan    [facility] [phone]    [facility] [phone]      See ENT for surgical options for sleep apnea surgical correction .    Plan Shingles vaccine at pharmacy

## 2021-05-25 LAB — DEPRECATED CALCIDIOL+CALCIFEROL SERPL-MC: 46 UG/L (ref 20–75)

## 2021-05-29 NOTE — RESULT ENCOUNTER NOTE
-Kidney function is abnormal (Cr, GFR), and its been stable.avoid over the counter NSAID and we will check it perioically  It does show decrSodium is normal, Potassium is normal, Calcium is normal, Glucose is normal.   -TSH (thyroid stimulating hormone) level is normal which indicates normal thyroid function.  -Vitamin D level is normal and getting 1000 IU daily in your diet or supplements is recommended.     Please keep us posted with questions or concerns .      Best Regards,    Geetha Mccarty MD  [facility]  [phone]

## 2021-06-01 ENCOUNTER — HOSPITAL ENCOUNTER (OUTPATIENT)
Dept: BONE DENSITY | Facility: CLINIC | Age: 86
Discharge: HOME | End: 2021-06-01
Attending: FAMILY MEDICINE | Admitting: FAMILY MEDICINE
Payer: MEDICARE

## 2021-06-01 DIAGNOSIS — Z91.89 AT RISK FOR OSTEOPOROSIS: ICD-10-CM

## 2021-06-01 DIAGNOSIS — Z78.0 ASYMPTOMATIC MENOPAUSAL STATE: ICD-10-CM

## 2021-06-01 PROCEDURE — 77080 DXA BONE DENSITY AXIAL: CPT

## 2021-06-02 NOTE — PROGRESS NOTES
Assessment & Plan   85 year old female with known history of sleep apnea, hypothyroidism and has been moving places between her 2 daughter, presents with fatigue of undetermined cause .      Other fatigue  - Comprehensive metabolic panel (BMP + Alb, Alk Phos, ALT, AST, Total. Bili, TP)  - CK total  - Lyme Confirm IgG by Immunoblot  - Ferritin  - CBC with platelets  Multifactorial - exam is within normal limits. No signs of anemia of need for change in thyroid replacement. Monitors symptoms and follow up in 2-4 weeks for unresolved concerns, follow up earlier as needed      She does have sleep [id number]  the CPAP machine and or mask    Abnormal finding of blood chemistry, unspecified   - Ferritin elevated- no other concerns- should be rechecked in 1 month    History of Lyme disease  -no need for antibiotic but if lyme's is positive or any rash develops- that is specific for lyme like ME- discussed with patient- then start the antibiotic  doxycycline hyclate (VIBRAMYCIN) 100 MG capsule; Take 1 capsule (100 mg) by mouth 2 times daily      37 minutes spent on the date of the encounter doing chart review, history and exam, documentation and further activities per the note  {Provider  Link to [facility] Help Grid :856092}         Return in about 1 week (around 6/10/2021) for concerns,unresolved.    Geetha Mccarty MD  [facility]   Jimbo is a 85 year old who presents for the following health issues     HPI     Fatigue    Onset: a couple of month(s) ago patient states it's hard for her to remember    How long have you felt fatigued: about a couple of month(s) ago                                                     Description:  Description of activities and lifestyle: patient is retired states due to covid she did not do a lot but did go on walks but if she walked 2 miles she would be very tired   Schedule and responsibilities: patient is retired but has been helping her daughter  "move   How much sleep are you getting: usually about 6 hours at a time  Daily exercise: minimal goes on walks   Are there episodes of normal energy levels: NO    Accompanying Signs & Symptoms:  Falling asleep during the day: YES- takes naps   Snoring: YES- probably   Do you stop breathing while sleeping: YES- pt states that if she sleeps on her back she does wake up                 Night sweats: no  Chest Pain: no  History of Alcohol/drug abuse:no  History of Depression: no  Any new anxiety/stressors:YES- moving   Abdominal pain: no   Change in appetite: no                 Weight gain/loss: no   Dark or bloody stools: no    Therapies tried and outcome: none    During covid yr- was not active  This yr moved in with her daughter  Had to pack unpack and now has plans to move back with another daughter.  She does like gardening.  She is able to stay active but by the end of the day- has to hold on to stiarcase railing to climb the stairs.  No chest pain, no shortness of breath.  Does not like cpap- and reorts has good sleep.  She is worred about lymes as well- as this year has been more out and about gardening.  She reports history of lymes disease probably about   Known history of thyroid.  TSH   Date Value Ref Range Status   05/24/2021 3.72 0.40 - 4.00 mU/L Final   appetite is normal   Wt is stable  Wt Readings from Last 5 Encounters:   06/03/21 69.9 kg (154 lb)   11/11/19 67.6 kg (149 lb)   11/04/19 67.9 kg (149 lb 12.8 oz)   10/07/19 67.7 kg (149 lb 4.8 oz)         Review of Systems   Constitutional, HEENT, cardiovascular, pulmonary, GI, , musculoskeletal, neuro, skin, endocrine and psych systems are negative, except as otherwise noted.      Objective    /75   Pulse 83   Temp 97.1  F (36.2  C) (Skin)   Resp 16   Ht 1.575 m (5' 2\")   Wt 69.9 kg (154 lb)   SpO2 97%   BMI 28.17 kg/m    Body mass index is 28.17 kg/m .  Physical Exam   GENERAL: healthy, alert and no distress  NECK: no adenopathy, no " asymmetry, masses, or scars and thyroid normal to palpation  RESP: lungs clear to auscultation - no rales, rhonchi or wheezes  CV: regular rate and rhythm, normal S1 S2, no S3 or S4, no murmur, click or rub, no peripheral edema and peripheral pulses strong  ABDOMEN: soft, nontender, no hepatosplenomegaly, no masses and bowel sounds normal  MS: no gross musculoskeletal defects noted, no edema  NEURO: Normal strength and tone, mentation intact and speech normal  PSYCH: mentation appears normal, affect normal/bright    Results for orders placed or performed in visit on 06/03/21   Comprehensive metabolic panel (BMP + Alb, Alk Phos, ALT, AST, Total. Bili, TP)     Status: None   Result Value Ref Range    Sodium 139 133 - 144 mmol/L    Potassium 4.3 3.4 - 5.3 mmol/L    Chloride 105 94 - 109 mmol/L    Carbon Dioxide 27 20 - 32 mmol/L    Anion Gap 7 3 - 14 mmol/L    Glucose 92 70 - 99 mg/dL    Urea Nitrogen 13 7 - 30 mg/dL    Creatinine 0.80 0.52 - 1.04 mg/dL    GFR Estimate 68 >60 mL/min/[1.73_m2]    GFR Estimate If Black 79 >60 mL/min/[1.73_m2]    Calcium 9.1 8.5 - 10.1 mg/dL    Bilirubin Total 0.3 0.2 - 1.3 mg/dL    Albumin 4.0 3.4 - 5.0 g/dL    Protein Total 7.6 6.8 - 8.8 g/dL    Alkaline Phosphatase 89 40 - 150 U/L    ALT 16 0 - 50 U/L    AST 26 0 - 45 U/L   CK total     Status: None   Result Value Ref Range    CK Total 115 30 - 225 U/L   Lyme Confirm IgG by Immunoblot     Status: None   Result Value Ref Range    Lyme Confirm IgG by Immunoblot Negative Negative   Ferritin     Status: Abnormal   Result Value Ref Range    Ferritin 356 (H) 8 - 252 ng/mL   CBC with platelets     Status: None   Result Value Ref Range    WBC 6.5 4.0 - 11.0 10e9/L    RBC Count 4.28 3.8 - 5.2 10e12/L    Hemoglobin 14.0 11.7 - 15.7 g/dL    Hematocrit 41.8 35.0 - 47.0 %    MCV 98 78 - 100 fl    MCH 32.7 26.5 - 33.0 pg    MCHC 33.5 31.5 - 36.5 g/dL    RDW 12.7 10.0 - 15.0 %    Platelet Count 268 150 - 450 10e9/L

## 2021-06-03 ENCOUNTER — OFFICE VISIT (OUTPATIENT)
Dept: FAMILY MEDICINE | Facility: CLINIC | Age: 86
End: 2021-06-03
Payer: MEDICARE

## 2021-06-03 VITALS
SYSTOLIC BLOOD PRESSURE: 137 MMHG | RESPIRATION RATE: 16 BRPM | OXYGEN SATURATION: 97 % | BODY MASS INDEX: 28.34 KG/M2 | HEART RATE: 83 BPM | TEMPERATURE: 97.1 F | HEIGHT: 62 IN | WEIGHT: 154 LBS | DIASTOLIC BLOOD PRESSURE: 75 MMHG

## 2021-06-03 DIAGNOSIS — R79.9 ABNORMAL FINDING OF BLOOD CHEMISTRY, UNSPECIFIED: ICD-10-CM

## 2021-06-03 DIAGNOSIS — Z86.19 HISTORY OF LYME DISEASE: ICD-10-CM

## 2021-06-03 DIAGNOSIS — R53.83 OTHER FATIGUE: Primary | ICD-10-CM

## 2021-06-03 LAB
ALBUMIN SERPL-MCNC: 4 G/DL (ref 3.4–5)
ALP SERPL-CCNC: 89 U/L (ref 40–150)
ALT SERPL W P-5'-P-CCNC: 16 U/L (ref 0–50)
ANION GAP SERPL CALCULATED.3IONS-SCNC: 7 MMOL/L (ref 3–14)
AST SERPL W P-5'-P-CCNC: 26 U/L (ref 0–45)
BILIRUB SERPL-MCNC: 0.3 MG/DL (ref 0.2–1.3)
BUN SERPL-MCNC: 13 MG/DL (ref 7–30)
CALCIUM SERPL-MCNC: 9.1 MG/DL (ref 8.5–10.1)
CHLORIDE SERPL-SCNC: 105 MMOL/L (ref 94–109)
CK SERPL-CCNC: 115 U/L (ref 30–225)
CO2 SERPL-SCNC: 27 MMOL/L (ref 20–32)
CREAT SERPL-MCNC: 0.8 MG/DL (ref 0.52–1.04)
ERYTHROCYTE [DISTWIDTH] IN BLOOD BY AUTOMATED COUNT: 12.7 % (ref 10–15)
FERRITIN SERPL-MCNC: 356 NG/ML (ref 8–252)
GFR SERPL CREATININE-BSD FRML MDRD: 68 ML/MIN/{1.73_M2}
GLUCOSE SERPL-MCNC: 92 MG/DL (ref 70–99)
HCT VFR BLD AUTO: 41.8 % (ref 35–47)
HGB BLD-MCNC: 14 G/DL (ref 11.7–15.7)
MCH RBC QN AUTO: 32.7 PG (ref 26.5–33)
MCHC RBC AUTO-ENTMCNC: 33.5 G/DL (ref 31.5–36.5)
MCV RBC AUTO: 98 FL (ref 78–100)
PLATELET # BLD AUTO: 268 10E9/L (ref 150–450)
POTASSIUM SERPL-SCNC: 4.3 MMOL/L (ref 3.4–5.3)
PROT SERPL-MCNC: 7.6 G/DL (ref 6.8–8.8)
RBC # BLD AUTO: 4.28 10E12/L (ref 3.8–5.2)
SODIUM SERPL-SCNC: 139 MMOL/L (ref 133–144)
WBC # BLD AUTO: 6.5 10E9/L (ref 4–11)

## 2021-06-03 PROCEDURE — 82550 ASSAY OF CK (CPK): CPT | Performed by: FAMILY MEDICINE

## 2021-06-03 PROCEDURE — 99000 SPECIMEN HANDLING OFFICE-LAB: CPT | Performed by: FAMILY MEDICINE

## 2021-06-03 PROCEDURE — 82728 ASSAY OF FERRITIN: CPT | Performed by: FAMILY MEDICINE

## 2021-06-03 PROCEDURE — 80053 COMPREHEN METABOLIC PANEL: CPT | Performed by: FAMILY MEDICINE

## 2021-06-03 PROCEDURE — 36415 COLL VENOUS BLD VENIPUNCTURE: CPT | Performed by: FAMILY MEDICINE

## 2021-06-03 PROCEDURE — 99214 OFFICE O/P EST MOD 30 MIN: CPT | Performed by: FAMILY MEDICINE

## 2021-06-03 PROCEDURE — 86617 LYME DISEASE ANTIBODY: CPT | Mod: 90 | Performed by: FAMILY MEDICINE

## 2021-06-03 PROCEDURE — 85027 COMPLETE CBC AUTOMATED: CPT | Performed by: FAMILY MEDICINE

## 2021-06-03 RX ORDER — DOXYCYCLINE 100 MG/1
100 CAPSULE ORAL 2 TIMES DAILY
Qty: 20 CAPSULE | Refills: 0 | Status: SHIPPED | OUTPATIENT
Start: 2021-06-03

## 2021-06-03 ASSESSMENT — MIFFLIN-ST. JEOR: SCORE: 1106.79

## 2021-06-05 NOTE — RESULT ENCOUNTER NOTE
Hi    lymes test result is still pending.  Kidney functions- abnormal & stable. You have avoided ibuprofen and that's great.  We can also do ultrasound of the kidney- Call to schedule your imaging:    [place] [facility] [phone]    [facility] [phone]      Rest of the labs normal and no signs of anemia , or liver and electrolytes problems'    Please keep us posted with questions or concerns .      Best Regards,    Geetha Mccarty MD  [facility]  [phone]

## 2021-06-06 LAB — B BURGDOR IGG SER QL IB: NEGATIVE

## 2021-07-05 PROBLEM — R53.83 OTHER FATIGUE: Status: ACTIVE | Noted: 2021-07-05

## 2021-07-05 PROBLEM — Z86.19 HISTORY OF LYME DISEASE: Status: ACTIVE | Noted: 2021-07-05

## 2021-07-05 PROBLEM — R79.9 ABNORMAL FINDING OF BLOOD CHEMISTRY, UNSPECIFIED: Status: ACTIVE | Noted: 2021-07-05

## 2021-07-23 ENCOUNTER — MYC MEDICAL ADVICE (OUTPATIENT)
Dept: FAMILY MEDICINE | Facility: CLINIC | Age: 86
End: 2021-07-23

## 2021-07-29 ENCOUNTER — MYC MEDICAL ADVICE (OUTPATIENT)
Dept: FAMILY MEDICINE | Facility: CLINIC | Age: 86
End: 2021-07-29

## 2021-07-29 ENCOUNTER — TELEPHONE (OUTPATIENT)
Dept: OTOLARYNGOLOGY | Facility: CLINIC | Age: 86
End: 2021-07-29

## 2021-07-29 DIAGNOSIS — H91.93 HEARING DECREASED, BILATERAL: Primary | ICD-10-CM

## 2021-07-29 NOTE — TELEPHONE ENCOUNTER
[facility] Call Center    Phone Message    May a detailed message be left on voicemail: yes     Reason for Call: Other:   Pt interested in a cochlear iimplant eval. Please follow-up with pt to assist.    Action Taken: Other:  ent    Travel Screening: Not Applicable

## 2021-07-29 NOTE — TELEPHONE ENCOUNTER
Writer called to inform patient to obtain a referral if they would like to be seen for a cochlear implant evaluation. Patient confirmed this and will try to obtain one.     Tobias Avila, EMT

## 2021-08-02 NOTE — TELEPHONE ENCOUNTER
Dr. Mccarty- Please see mychart message from patient. Please advise. THanks    Brianna Kwok  Referral Coordinator

## 2021-08-03 ENCOUNTER — TELEPHONE (OUTPATIENT)
Dept: AUDIOLOGY | Facility: CLINIC | Age: 86
End: 2021-08-03

## 2021-08-04 NOTE — TELEPHONE ENCOUNTER
FUTURE VISIT INFORMATION      FUTURE VISIT INFORMATION:    Date: 9/13/21    Time: 1:00pm    Location: [facility]  REFERRAL INFORMATION:  Referring provider:  Geetha Mccarty MD    Referring providers clinic:  [facility]    Reason for visit/diagnosis  CIE    RECORDS REQUESTED FROM:       Clinic name Comments Records Status Imaging Status   [facility] My chart advice/referral EPIC    [place] hearing aid professionals Per Clinic [facility] has all of the med rec-Request for recs sent 8/4- received and sent ot scanning- last audio 6/8/17 EPIC

## 2021-08-05 NOTE — RESULT ENCOUNTER NOTE
Hi    Mild osetopenia (mild thinning) and that's better than osteoporosis.  Keep taking over the counter Vitamin d 1000 international unit once daily.  Stay active.  We can repeat the scan in a couple yrs    Please keep us posted with questions or concerns .      Best Regards,    Geetha Mccarty MD  [facility]  [phone]

## 2021-09-05 ENCOUNTER — HEALTH MAINTENANCE LETTER (OUTPATIENT)
Age: 86
End: 2021-09-05

## 2021-09-13 ENCOUNTER — OFFICE VISIT (OUTPATIENT)
Dept: AUDIOLOGY | Facility: CLINIC | Age: 86
End: 2021-09-13
Payer: MEDICARE

## 2021-09-13 ENCOUNTER — PRE VISIT (OUTPATIENT)
Dept: AUDIOLOGY | Facility: CLINIC | Age: 86
End: 2021-09-13

## 2021-09-13 DIAGNOSIS — H90.3 ASYMMETRIC SNHL (SENSORINEURAL HEARING LOSS): Primary | ICD-10-CM

## 2021-09-13 DIAGNOSIS — H91.93 HEARING DECREASED, BILATERAL: ICD-10-CM

## 2021-09-13 DIAGNOSIS — H90.3 SENSORINEURAL HEARING LOSS, ASYMMETRICAL: Primary | ICD-10-CM

## 2021-09-13 PROCEDURE — 92565 STENGER TEST PURE TONE: CPT | Performed by: AUDIOLOGIST

## 2021-09-13 PROCEDURE — 92550 TYMPANOMETRY & REFLEX THRESH: CPT | Performed by: AUDIOLOGIST

## 2021-09-13 PROCEDURE — 99207 PR ASSESSMENT FOR HEARING AID: CPT | Performed by: AUDIOLOGIST

## 2021-09-13 PROCEDURE — 92557 COMPREHENSIVE HEARING TEST: CPT | Performed by: AUDIOLOGIST

## 2021-09-13 NOTE — PROGRESS NOTES
AUDIOLOGY REPORT    SUBJECTIVE:     Rosalba Logan 85 year old female  was seen in Audiology at [facility], on 9/13/2021 to assess audiologic candidacy for a cochlear implant, which was ordered by Geetha Mccarty MD.  Rosalba has a diagnosis of  moderate sloping to severe sensorineural hearing loss in the right ear, and a mild sloping to moderate sensorineural hearing loss in the left ear.    The patient currently utilizes a ReSound RITE hearing aid fit in about 2013 from an outside clinic.  The patient also reports that she is likely moving to [place], [place] in October 2021.    OBJECTIVE:    To evaluate candidacy for cochlear implant. Candidacy requires at least a moderate to profound sensorineural hearing loss in both ears in most cases; good general health; lack of benefit from conventional amplification as determined from the tests below,:psychological/emotional stability and motivationally suitable, with realistic expectations, and absence of medical conditions contraindicating surgical intervention.     ASSESSMENT:   The results of the hearing evaluation that she had today were reviewed with the patient. It was reviewed that her insurance has criteria that must be met in order to be a cochlear implant candidate (must score below 60% in the best aided condition in quiet AND below 40% in the best aided condition in noise on the [employer] sentence test).  Her current word recognition scores of 72% in the left ear and 52% in the right ear were reviewed with her. It was discussed that based on those scores, she likely is not a candidate for a cochlear implant.     Realistic expectations. Relearning to hear will be a slow process as sound is different from acoustic hearing; time, practice, and programming is needed to optimize sound quality and speech understanding; hearing may never be ideal or as expected and understanding in background noise may not be limited. It was reviewed that  average performance on the sentence testing with a cochlear implant is 60-70%.    After reviewing the results of the hearing evaluation it was offered that the full testing could be completed but the patient declined. Simulated real-ear measurements were performed to see where her current hearing aids were programmed. The right hearing aid was found to be matching NAL-NL1 target gains, the left hearing aid was underfit from 250-1 kHz but the device did sound slightly distorted so it could not be determined if it is a programming issue or a device issue for the left ear. The patient expressed understanding.    Rosalba had questions regarding obtaining new hearing aids. A trial with new amplification is recommended, it was discussed that there have been advancements in technology to help in background noise. It was also reviewed that accessories can be purchased to help with complex environments. The range of pricing for this clinic was mentioned ($2000-$6600 for both devices). It was discussed that if she plans on moving to [place], it would be recommended that she obtain new hearing aids in [place] so that she would be better supported for follow-up appointments.  The patient expressed understanding.    It was reviewed that if her word recognition scores decline, she may be a cochlear implant candidate in the future.  She was provided a handout with communication strategies for herself as well as for friends and family.        PLAN: Rosalba has been diagnosed with a bilateral hearing loss. The cochlear implant evaluation was not completed given her word recognition scores on the hearing test she had done earlier in the day because she most likely would not meet the Medicare criteria for a cochlear implant.  It is recommended that she pursue a trial with new amplification in [place], [place]. It was recommended that she continue to monitor her hearing status every 1-2 years, sooner if concerns arise.  If patient's word  recognition scores continue to decrease, she may be a cochlear implant candidate in the future. Today's appointment is a no charge visit as only counseling was performed. Please contact the clinic at [phone] with questions regarding these results or recommendations.    Filemon Kc  Audiologist  [place] License  #[id number]

## 2021-09-13 NOTE — PROGRESS NOTES
AUDIOLOGY REPORT    SUMMARY: Audiology visit completed. See audiogram for results.      RECOMMENDATIONS: Follow-up with ENT.      Amalia Young.  Licensed Audiologist  [place] #[id number]

## 2021-09-24 DIAGNOSIS — E03.9 HYPOTHYROIDISM, UNSPECIFIED TYPE: ICD-10-CM

## 2021-09-27 ENCOUNTER — MYC MEDICAL ADVICE (OUTPATIENT)
Dept: FAMILY MEDICINE | Facility: CLINIC | Age: 86
End: 2021-09-27

## 2021-09-27 DIAGNOSIS — E03.9 HYPOTHYROIDISM, UNSPECIFIED TYPE: ICD-10-CM

## 2021-09-27 RX ORDER — LEVOTHYROXINE, LIOTHYRONINE 38; 9 UG/1; UG/1
TABLET ORAL
Qty: 90 TABLET | Refills: 1 | Status: SHIPPED | OUTPATIENT
Start: 2021-09-27

## 2021-09-27 NOTE — TELEPHONE ENCOUNTER
Routing refill request to provider for review/approval because:  Medication is reported/historical  Vee MILLER RN

## 2021-10-29 ENCOUNTER — MYC MEDICAL ADVICE (OUTPATIENT)
Dept: FAMILY MEDICINE | Facility: CLINIC | Age: 86
End: 2021-10-29

## 2022-08-07 ENCOUNTER — HEALTH MAINTENANCE LETTER (OUTPATIENT)
Age: 87
End: 2022-08-07

## 2022-10-23 ENCOUNTER — HEALTH MAINTENANCE LETTER (OUTPATIENT)
Age: 87
End: 2022-10-23

## 2023-09-02 ENCOUNTER — HEALTH MAINTENANCE LETTER (OUTPATIENT)
Age: 88
End: 2023-09-02